# Patient Record
Sex: FEMALE | Employment: UNEMPLOYED | URBAN - METROPOLITAN AREA
[De-identification: names, ages, dates, MRNs, and addresses within clinical notes are randomized per-mention and may not be internally consistent; named-entity substitution may affect disease eponyms.]

---

## 2021-11-07 ENCOUNTER — ANESTHESIA EVENT (OUTPATIENT)
Dept: LABOR AND DELIVERY | Age: 36
DRG: 560 | End: 2021-11-07
Payer: MEDICAID

## 2021-11-07 ENCOUNTER — HOSPITAL ENCOUNTER (INPATIENT)
Age: 36
LOS: 2 days | Discharge: HOME | DRG: 560 | End: 2021-11-09
Attending: OBSTETRICS & GYNECOLOGY | Admitting: OBSTETRICS & GYNECOLOGY
Payer: MEDICAID

## 2021-11-07 ENCOUNTER — ANESTHESIA (OUTPATIENT)
Dept: LABOR AND DELIVERY | Age: 36
DRG: 560 | End: 2021-11-07
Payer: MEDICAID

## 2021-11-07 PROBLEM — Z37.9 NORMAL LABOR: Status: ACTIVE | Noted: 2021-11-07

## 2021-11-07 PROBLEM — Z34.93 ENCOUNTER FOR PREGNANCY RELATED EXAMINATION IN THIRD TRIMESTER: Status: ACTIVE | Noted: 2021-11-07

## 2021-11-07 PROBLEM — Z34.93 ENCOUNTER FOR PREGNANCY RELATED EXAMINATION IN THIRD TRIMESTER: Status: RESOLVED | Noted: 2021-11-07 | Resolved: 2021-11-07

## 2021-11-07 LAB
ABO/RH: NORMAL
AMPHETAMINES: NEGATIVE
ANTIBODY SCREEN: NEGATIVE
BACTERIA: NEGATIVE /HPF
BARBITURATE SCREEN URINE: NEGATIVE
BASOPHILS ABSOLUTE: 0 K/CU MM
BASOPHILS RELATIVE PERCENT: 0.3 % (ref 0–1)
BENZODIAZEPINE SCREEN, URINE: NEGATIVE
BILIRUBIN URINE: NEGATIVE MG/DL
BLOOD, URINE: NEGATIVE
CANNABINOID SCREEN URINE: NEGATIVE
CLARITY: CLEAR
COCAINE METABOLITE: NEGATIVE
COLOR: COLORLESS
DIFFERENTIAL TYPE: ABNORMAL
EOSINOPHILS ABSOLUTE: 0.2 K/CU MM
EOSINOPHILS RELATIVE PERCENT: 2.5 % (ref 0–3)
GLUCOSE, URINE: NEGATIVE MG/DL
HCT VFR BLD CALC: 34.8 % (ref 37–47)
HEMOGLOBIN: 11.4 GM/DL (ref 12.5–16)
IMMATURE NEUTROPHIL %: 0.4 % (ref 0–0.43)
KETONES, URINE: NEGATIVE MG/DL
LEUKOCYTE ESTERASE, URINE: NEGATIVE
LYMPHOCYTES ABSOLUTE: 2 K/CU MM
LYMPHOCYTES RELATIVE PERCENT: 26 % (ref 24–44)
MCH RBC QN AUTO: 32.5 PG (ref 27–31)
MCHC RBC AUTO-ENTMCNC: 32.8 % (ref 32–36)
MCV RBC AUTO: 99.1 FL (ref 78–100)
MONOCYTES ABSOLUTE: 0.9 K/CU MM
MONOCYTES RELATIVE PERCENT: 12.2 % (ref 0–4)
MUCUS: ABNORMAL HPF
NITRITE URINE, QUANTITATIVE: NEGATIVE
NUCLEATED RBC %: 0 %
OPIATES, URINE: NEGATIVE
OXYCODONE: NEGATIVE
PDW BLD-RTO: 14.2 % (ref 11.7–14.9)
PH, URINE: 8 (ref 5–8)
PHENCYCLIDINE, URINE: NEGATIVE
PLATELET # BLD: 177 K/CU MM (ref 140–440)
PMV BLD AUTO: 11.1 FL (ref 7.5–11.1)
PROTEIN UA: NEGATIVE MG/DL
RBC # BLD: 3.51 M/CU MM (ref 4.2–5.4)
RBC URINE: ABNORMAL /HPF (ref 0–6)
SEGMENTED NEUTROPHILS ABSOLUTE COUNT: 4.5 K/CU MM
SEGMENTED NEUTROPHILS RELATIVE PERCENT: 58.6 % (ref 36–66)
SPECIFIC GRAVITY UA: 1 (ref 1–1.03)
SQUAMOUS EPITHELIAL: <1 /HPF
TOTAL IMMATURE NEUTOROPHIL: 0.03 K/CU MM
TOTAL NUCLEATED RBC: 0 K/CU MM
TRICHOMONAS: ABNORMAL /HPF
UROBILINOGEN, URINE: NEGATIVE MG/DL (ref 0.2–1)
WBC # BLD: 7.7 K/CU MM (ref 4–10.5)
WBC UA: ABNORMAL /HPF (ref 0–5)

## 2021-11-07 PROCEDURE — 6360000002 HC RX W HCPCS: Performed by: NURSE ANESTHETIST, CERTIFIED REGISTERED

## 2021-11-07 PROCEDURE — 10907ZC DRAINAGE OF AMNIOTIC FLUID, THERAPEUTIC FROM PRODUCTS OF CONCEPTION, VIA NATURAL OR ARTIFICIAL OPENING: ICD-10-PCS | Performed by: OBSTETRICS & GYNECOLOGY

## 2021-11-07 PROCEDURE — 7200000001 HC VAGINAL DELIVERY

## 2021-11-07 PROCEDURE — 86901 BLOOD TYPING SEROLOGIC RH(D): CPT

## 2021-11-07 PROCEDURE — 85025 COMPLETE CBC W/AUTO DIFF WBC: CPT

## 2021-11-07 PROCEDURE — 1220000000 HC SEMI PRIVATE OB R&B

## 2021-11-07 PROCEDURE — 2580000003 HC RX 258: Performed by: OBSTETRICS & GYNECOLOGY

## 2021-11-07 PROCEDURE — 51703 INSERT BLADDER CATH COMPLEX: CPT

## 2021-11-07 PROCEDURE — 80307 DRUG TEST PRSMV CHEM ANLYZR: CPT

## 2021-11-07 PROCEDURE — 86850 RBC ANTIBODY SCREEN: CPT

## 2021-11-07 PROCEDURE — 2500000003 HC RX 250 WO HCPCS: Performed by: NURSE ANESTHETIST, CERTIFIED REGISTERED

## 2021-11-07 PROCEDURE — 86900 BLOOD TYPING SEROLOGIC ABO: CPT

## 2021-11-07 PROCEDURE — 81001 URINALYSIS AUTO W/SCOPE: CPT

## 2021-11-07 RX ORDER — ONDANSETRON 2 MG/ML
4 INJECTION INTRAMUSCULAR; INTRAVENOUS EVERY 6 HOURS PRN
Status: DISCONTINUED | OUTPATIENT
Start: 2021-11-07 | End: 2021-11-07

## 2021-11-07 RX ORDER — ROPIVACAINE HYDROCHLORIDE 2 MG/ML
12 INJECTION, SOLUTION EPIDURAL; INFILTRATION; PERINEURAL CONTINUOUS
Status: DISCONTINUED | OUTPATIENT
Start: 2021-11-07 | End: 2021-11-09 | Stop reason: HOSPADM

## 2021-11-07 RX ORDER — SODIUM CHLORIDE 9 MG/ML
25 INJECTION, SOLUTION INTRAVENOUS PRN
Status: DISCONTINUED | OUTPATIENT
Start: 2021-11-07 | End: 2021-11-09 | Stop reason: HOSPADM

## 2021-11-07 RX ORDER — SODIUM CHLORIDE 9 MG/ML
25 INJECTION, SOLUTION INTRAVENOUS PRN
Status: DISCONTINUED | OUTPATIENT
Start: 2021-11-07 | End: 2021-11-07

## 2021-11-07 RX ORDER — LANOLIN 100 %
OINTMENT (GRAM) TOPICAL PRN
Status: DISCONTINUED | OUTPATIENT
Start: 2021-11-07 | End: 2021-11-09 | Stop reason: HOSPADM

## 2021-11-07 RX ORDER — DOCUSATE SODIUM 100 MG/1
100 CAPSULE, LIQUID FILLED ORAL 2 TIMES DAILY
Status: DISCONTINUED | OUTPATIENT
Start: 2021-11-07 | End: 2021-11-07

## 2021-11-07 RX ORDER — SODIUM CHLORIDE, SODIUM LACTATE, POTASSIUM CHLORIDE, CALCIUM CHLORIDE 600; 310; 30; 20 MG/100ML; MG/100ML; MG/100ML; MG/100ML
INJECTION, SOLUTION INTRAVENOUS CONTINUOUS
Status: DISCONTINUED | OUTPATIENT
Start: 2021-11-07 | End: 2021-11-07

## 2021-11-07 RX ORDER — SODIUM CHLORIDE, SODIUM LACTATE, POTASSIUM CHLORIDE, AND CALCIUM CHLORIDE .6; .31; .03; .02 G/100ML; G/100ML; G/100ML; G/100ML
1000 INJECTION, SOLUTION INTRAVENOUS PRN
Status: DISCONTINUED | OUTPATIENT
Start: 2021-11-07 | End: 2021-11-09 | Stop reason: HOSPADM

## 2021-11-07 RX ORDER — ONDANSETRON 4 MG/1
8 TABLET, ORALLY DISINTEGRATING ORAL EVERY 8 HOURS PRN
Status: DISCONTINUED | OUTPATIENT
Start: 2021-11-07 | End: 2021-11-09 | Stop reason: HOSPADM

## 2021-11-07 RX ORDER — LIDOCAINE HYDROCHLORIDE 10 MG/ML
INJECTION, SOLUTION EPIDURAL; INFILTRATION; INTRACAUDAL; PERINEURAL PRN
Status: DISCONTINUED | OUTPATIENT
Start: 2021-11-07 | End: 2021-12-06 | Stop reason: SDUPTHER

## 2021-11-07 RX ORDER — ROPIVACAINE HYDROCHLORIDE 2 MG/ML
INJECTION, SOLUTION EPIDURAL; INFILTRATION; PERINEURAL CONTINUOUS PRN
Status: DISCONTINUED | OUTPATIENT
Start: 2021-11-07 | End: 2021-12-06 | Stop reason: SDUPTHER

## 2021-11-07 RX ORDER — LIDOCAINE HYDROCHLORIDE AND EPINEPHRINE 15; 5 MG/ML; UG/ML
INJECTION, SOLUTION EPIDURAL PRN
Status: DISCONTINUED | OUTPATIENT
Start: 2021-11-07 | End: 2021-12-06 | Stop reason: SDUPTHER

## 2021-11-07 RX ORDER — SODIUM CHLORIDE, SODIUM LACTATE, POTASSIUM CHLORIDE, AND CALCIUM CHLORIDE .6; .31; .03; .02 G/100ML; G/100ML; G/100ML; G/100ML
500 INJECTION, SOLUTION INTRAVENOUS PRN
Status: DISCONTINUED | OUTPATIENT
Start: 2021-11-07 | End: 2021-11-09 | Stop reason: HOSPADM

## 2021-11-07 RX ORDER — ROPIVACAINE HYDROCHLORIDE 2 MG/ML
INJECTION, SOLUTION EPIDURAL; INFILTRATION; PERINEURAL PRN
Status: DISCONTINUED | OUTPATIENT
Start: 2021-11-07 | End: 2021-12-06 | Stop reason: SDUPTHER

## 2021-11-07 RX ORDER — SODIUM CHLORIDE 0.9 % (FLUSH) 0.9 %
5-40 SYRINGE (ML) INJECTION PRN
Status: DISCONTINUED | OUTPATIENT
Start: 2021-11-07 | End: 2021-11-09 | Stop reason: HOSPADM

## 2021-11-07 RX ORDER — SODIUM CHLORIDE, SODIUM LACTATE, POTASSIUM CHLORIDE, CALCIUM CHLORIDE 600; 310; 30; 20 MG/100ML; MG/100ML; MG/100ML; MG/100ML
INJECTION, SOLUTION INTRAVENOUS CONTINUOUS
Status: DISCONTINUED | OUTPATIENT
Start: 2021-11-08 | End: 2021-11-09 | Stop reason: HOSPADM

## 2021-11-07 RX ORDER — SODIUM CHLORIDE 0.9 % (FLUSH) 0.9 %
5-40 SYRINGE (ML) INJECTION 2 TIMES DAILY
Status: DISCONTINUED | OUTPATIENT
Start: 2021-11-08 | End: 2021-11-09 | Stop reason: HOSPADM

## 2021-11-07 RX ORDER — ACETAMINOPHEN 325 MG/1
650 TABLET ORAL EVERY 4 HOURS PRN
Status: DISCONTINUED | OUTPATIENT
Start: 2021-11-07 | End: 2021-11-09 | Stop reason: HOSPADM

## 2021-11-07 RX ORDER — SODIUM CHLORIDE 0.9 % (FLUSH) 0.9 %
5-40 SYRINGE (ML) INJECTION EVERY 12 HOURS SCHEDULED
Status: DISCONTINUED | OUTPATIENT
Start: 2021-11-07 | End: 2021-11-07

## 2021-11-07 RX ORDER — NALOXONE HYDROCHLORIDE 0.4 MG/ML
0.4 INJECTION, SOLUTION INTRAMUSCULAR; INTRAVENOUS; SUBCUTANEOUS PRN
Status: DISCONTINUED | OUTPATIENT
Start: 2021-11-07 | End: 2021-11-09 | Stop reason: HOSPADM

## 2021-11-07 RX ORDER — SODIUM CHLORIDE 0.9 % (FLUSH) 0.9 %
5-40 SYRINGE (ML) INJECTION PRN
Status: DISCONTINUED | OUTPATIENT
Start: 2021-11-07 | End: 2021-11-07

## 2021-11-07 RX ORDER — ONDANSETRON 2 MG/ML
4 INJECTION INTRAMUSCULAR; INTRAVENOUS EVERY 6 HOURS PRN
Status: DISCONTINUED | OUTPATIENT
Start: 2021-11-07 | End: 2021-11-09 | Stop reason: HOSPADM

## 2021-11-07 RX ORDER — DOCUSATE SODIUM 100 MG/1
100 CAPSULE, LIQUID FILLED ORAL 2 TIMES DAILY
Status: DISCONTINUED | OUTPATIENT
Start: 2021-11-08 | End: 2021-11-09 | Stop reason: HOSPADM

## 2021-11-07 RX ADMIN — LIDOCAINE HYDROCHLORIDE 3 ML: 10 INJECTION, SOLUTION EPIDURAL; INFILTRATION; INTRACAUDAL; PERINEURAL at 20:20

## 2021-11-07 RX ADMIN — Medication 500 ML: at 22:22

## 2021-11-07 RX ADMIN — SODIUM CHLORIDE, POTASSIUM CHLORIDE, SODIUM LACTATE AND CALCIUM CHLORIDE: 600; 310; 30; 20 INJECTION, SOLUTION INTRAVENOUS at 20:17

## 2021-11-07 RX ADMIN — ROPIVACAINE HYDROCHLORIDE 12 ML/HR: 2 INJECTION, SOLUTION EPIDURAL; INFILTRATION; PERINEURAL at 20:36

## 2021-11-07 RX ADMIN — LIDOCAINE HYDROCHLORIDE,EPINEPHRINE BITARTRATE 5 ML: 15; .005 INJECTION, SOLUTION EPIDURAL; INFILTRATION; INTRACAUDAL; PERINEURAL at 20:27

## 2021-11-07 RX ADMIN — SODIUM CHLORIDE, POTASSIUM CHLORIDE, SODIUM LACTATE AND CALCIUM CHLORIDE: 600; 310; 30; 20 INJECTION, SOLUTION INTRAVENOUS at 19:05

## 2021-11-07 RX ADMIN — ROPIVACAINE HYDROCHLORIDE 5 MG: 2 INJECTION, SOLUTION EPIDURAL; INFILTRATION at 20:34

## 2021-11-07 RX ADMIN — ROPIVACAINE HYDROCHLORIDE 5 MG: 2 INJECTION, SOLUTION EPIDURAL; INFILTRATION at 20:29

## 2021-11-07 NOTE — H&P
Department of Obstetrics and Gynecology   Obstetrics History and Physical        CHIEF COMPLAINT:   Chief Complaint   Patient presents with    Abdominal Pain     x 3 days          HISTORY OF PRESENT ILLNESS:      The patient is a 39 y.o. female at 41w4d. OB History        3    Para   2    Term   2            AB        Living   2       SAB        IAB        Ectopic        Molar        Multiple        Live Births   2            Patient presents with a chief complaint as above and is being admitted for active phase labor    Estimated Due Date: Estimated Date of Delivery: 21    PRENATAL CARE:    Complicated by: scantly prenatal care. Pt reports starting her prenatal care at [address] at 8 months    PAST OB HISTORY  OB History        3    Para   2    Term   2            AB        Living   2       SAB        IAB        Ectopic        Molar        Multiple        Live Births   2                Past Medical History:        Diagnosis Date    Anemia     Per patient report     Past Surgical History:    History reviewed. No pertinent surgical history. Allergies:  Patient has no known allergies.   Social History:    Social History     Socioeconomic History    Marital status: Single     Spouse name: Not on file    Number of children: Not on file    Years of education: Not on file    Highest education level: Not on file   Occupational History    Not on file   Tobacco Use    Smoking status: Never Smoker    Smokeless tobacco: Never Used   Vaping Use    Vaping Use: Not on file   Substance and Sexual Activity    Alcohol use: Not Currently    Drug use: Never    Sexual activity: Not on file   Other Topics Concern    Not on file   Social History Narrative    Not on file     Social Determinants of Health     Financial Resource Strain:     Difficulty of Paying Living Expenses: Not on file   Food Insecurity:     Worried About Running Out of Food in the Last Year: Not on file    [address] in the Last Year: Not on file   Transportation Needs:     Lack of Transportation (Medical): Not on file    Lack of Transportation (Non-Medical): Not on file   Physical Activity:     Days of Exercise per Week: Not on file    Minutes of Exercise per Session: Not on file   Stress:     Feeling of Stress : Not on file   Social Connections:     Frequency of Communication with Friends and Family: Not on file    Frequency of Social Gatherings with Friends and Family: Not on file    Attends Confucianism Services: Not on file    Active Member of [address] [place] [address] [employer] or Organizations: Not on file    Attends Club or Organization Meetings: Not on file    Marital Status: Not on file   Intimate Partner Violence:     Fear of Current or Ex-Partner: Not on file    Emotionally Abused: Not on file    Physically Abused: Not on file    Sexually Abused: Not on file   Housing Stability:     Unable to Pay for Housing in the Last Year: Not on file    Number of [place] in the Last Year: Not on file    Unstable Housing in the Last Year: Not on file     Family History:   History reviewed. No pertinent family history. Medications Prior to Admission:  No medications prior to admission. REVIEW OF SYSTEMS:    CONSTITUTIONAL:  negative  RESPIRATORY:  negative  CARDIOVASCULAR:  negative  GASTROINTESTINAL:  negative  ALLERGIC/IMMUNOLOGIC:  negative  NEUROLOGICAL:  negative  BEHAVIOR/PSYCH:  negative    PHYSICAL EXAM:  Blood pressure 118/76, pulse 86, temperature 98.2 °F (36.8 °C), temperature source Oral, resp. rate 19, SpO2 100 %. General appearance:  awake, alert, cooperative, no apparent distress, and appears stated age  Neurologic:  Awake, alert, oriented to name, place and time.     Lungs:  No increased work of breathing, good air exchange  Abdomen:  Soft, non tender, gravid, consistent with her gestational age,   Fetal heart rate:    Baseline Heart Rate:  135        Accelerations:  present       Long Term Variability:  moderate Decelerations:  absent       Pelvis:  Adequate pelvis  Cervix: 3-4 per RN exam    Contraction frequency:  2-4 minutes    Membranes:  Intact    ASSESSMENT AND PLAN:    Labor: Admit, anticipate normal delivery, routine labor orders  Fetus: Reassuring  GBS:negative  Other: expectant management.  Epidural prn    Pt is [place] speaking

## 2021-11-07 NOTE — FLOWSHEET NOTE
During use of  #[id number], pt reports she started receiving care for this pregnancy at around 8 months at the [facility] and does not think that they have done any testing for her other than an ultrasound.

## 2021-11-07 NOTE — FLOWSHEET NOTE
Dr. Urbano Burks on unit and reviewing strip. Made aware of patient's complaints and obstetrical and medical history, SVE, and negative amnisure. Admission labor orders being placed per Dr. Urbano Burks. MRI attempted but not completed. Patient became hypoxic, hypertensive, and tachycardic when laying flat. CARLEEN Cordova updated.

## 2021-11-07 NOTE — FLOWSHEET NOTE
Pt arrived with spouse via wheelchair to birthing center with complaints of lower abdominal pain. Pt speaks [place]. Unable to connect with [place]  but able to connect with [place]  and patient able to understand. Pt reports lower abdominal pain for the past 3 days. States that she has been feeling her baby move. Denies nausea or vomiting. Reports vaginal leaking of fluid. Denies vaginal bleeding. Pt reports this is her third baby and has had her other children vaginally in other countries. Denies any known complications with this pregnancy or others. Reports receiving care \"a couple of times\" at the [address]. SVE performed. See documentation. Pt oriented to room and tentative POC discussed.

## 2021-11-08 PROCEDURE — 6370000000 HC RX 637 (ALT 250 FOR IP): Performed by: OBSTETRICS & GYNECOLOGY

## 2021-11-08 PROCEDURE — 1220000000 HC SEMI PRIVATE OB R&B

## 2021-11-08 RX ADMIN — ACETAMINOPHEN 650 MG: 325 TABLET ORAL at 14:22

## 2021-11-08 RX ADMIN — DOCUSATE SODIUM 100 MG: 100 CAPSULE, LIQUID FILLED ORAL at 22:03

## 2021-11-08 RX ADMIN — DOCUSATE SODIUM 100 MG: 100 CAPSULE, LIQUID FILLED ORAL at 08:22

## 2021-11-08 RX ADMIN — ACETAMINOPHEN 650 MG: 325 TABLET ORAL at 08:22

## 2021-11-08 ASSESSMENT — PAIN SCALES - GENERAL
PAINLEVEL_OUTOF10: 0
PAINLEVEL_OUTOF10: 0
PAINLEVEL_OUTOF10: 3
PAINLEVEL_OUTOF10: 3
PAINLEVEL_OUTOF10: 0
PAINLEVEL_OUTOF10: 0

## 2021-11-08 NOTE — ANESTHESIA POSTPROCEDURE EVALUATION
Department of Anesthesiology  Postprocedure Note    Patient: Tamhina Francois  MRN: [id number]  YOB: 1985  Date of evaluation: 11/8/2021  Time:  6:35 PM     Procedure Summary     Date: 11/07/21 Room / Location:     Anesthesia Start: 2020 Anesthesia Stop:     Procedure: Labor Analgesia Diagnosis:     Scheduled Providers:  Responsible Provider: Janes Ng MD    Anesthesia Type: epidural ASA Status: 2          Anesthesia Type: No value filed. Horacio Phase I: Horacio Score: 9    Horacio Phase II:      Last vitals: Reviewed and per EMR flowsheets.        Anesthesia Post Evaluation    Patient location during evaluation: floor  Patient participation: complete - patient participated  Level of consciousness: awake and alert  Pain score: 1  Airway patency: patent  Nausea & Vomiting: no nausea  Complications: no  Cardiovascular status: hemodynamically stable  Respiratory status: acceptable  Hydration status: euvolemic

## 2021-11-08 NOTE — PLAN OF CARE
Problem: Pain:  Goal: Pain level will decrease  Description: Pain level will decrease  11/8/2021 1204 by Chuy Moyer RN  Outcome: Ongoing  11/8/2021 0128 by Kayla Holman RN  Outcome: Ongoing  11/8/2021 0128 by Kayla Holman RN  Outcome: Ongoing  Goal: Control of acute pain  Description: Control of acute pain  11/8/2021 0128 by Kayla Holman RN  Outcome: Ongoing  11/8/2021 0128 by Kayla Holman RN  Outcome: Ongoing  Goal: Control of chronic pain  Description: Control of chronic pain  11/8/2021 0128 by Kayla Holman RN  Outcome: Ongoing  11/8/2021 0128 by Kayla Holman RN  Outcome: Ongoing     Problem: Discharge Planning:  Goal: Discharged to appropriate level of care  Description: Discharged to appropriate level of care  11/8/2021 1204 by Chuy Moyer RN  Outcome: Ongoing  11/8/2021 0128 by Kayla Holman RN  Outcome: Ongoing     Problem: Constipation:  Goal: Bowel elimination is within specified parameters  Description: Bowel elimination is within specified parameters  11/8/2021 1204 by Chuy Moyer RN  Outcome: Ongoing  11/8/2021 0128 by Kayla Holman RN  Outcome: Ongoing     Problem: Fluid Volume - Imbalance:  Goal: Absence of imbalanced fluid volume signs and symptoms  Description: Absence of imbalanced fluid volume signs and symptoms  11/8/2021 1204 by Chuy Moyer RN  Outcome: Ongoing  11/8/2021 0128 by Kayla Holman RN  Outcome: Ongoing  Goal: Absence of postpartum hemorrhage signs and symptoms  Description: Absence of postpartum hemorrhage signs and symptoms  11/8/2021 1204 by Chuy Moyer RN  Outcome: Ongoing  11/8/2021 0128 by Kayla Holman RN  Outcome: Ongoing     Problem: Infection - Risk of, Puerperal Infection:  Goal: Will show no infection signs and symptoms  Description: Will show no infection signs and symptoms  11/8/2021 1204 by Chuy Moyer RN  Outcome: Ongoing  11/8/2021 0128 by Kayla Holman RN  Outcome: Ongoing Learning behavioral activities to cope with urges.  For example, distraction and changing routines

## 2021-11-08 NOTE — ANESTHESIA PRE PROCEDURE
Department of Anesthesiology  Preprocedure Note       Name:  Trev Chester   Age:  39 y.o.  :  1985                                          MRN:  [id number]         Date:  2021      Surgeon: * No surgeons listed *    Procedure: * No procedures listed *    Medications prior to admission:   Prior to Admission medications    Not on File       Current medications:    Current Facility-Administered Medications   Medication Dose Route Frequency Provider Last Rate Last Admin    lactated ringers infusion   IntraVENous Continuous Aydee Dunn  mL/hr at 21 New Bag at 21    lactated ringers bolus  500 mL IntraVENous PRN Aydee Dunn MD        Or    lactated ringers bolus  1,000 mL IntraVENous PRN Aydee Dunn MD        sodium chloride flush 0.9 % injection 5-40 mL  5-40 mL IntraVENous 2 times per day Love Hobbs MD        sodium chloride flush 0.9 % injection 5-40 mL  5-40 mL IntraVENous PRN Aydee Dunn MD        0.9 % sodium chloride infusion  25 mL IntraVENous PRN Aydee Dunn MD        oxytocin (PITOCIN) 30 units in 500 mL infusion  87.3 artis-units/min IntraVENous Continuous PRN Aydee Dunn MD        And    oxytocin (PITOCIN) 10 unit bolus from the bag  10 Units IntraVENous PRN Aydee Dunn MD        docusate sodium (COLACE) capsule 100 mg  100 mg Oral BID Aydee Dunn MD        famotidine (PEPCID) injection 20 mg  20 mg IntraVENous BID PRN oLve Hobbs MD        ropivacaine (NAROPIN) 0.2% injection 0.2%  12 mL/hr Epidural Continuous Les Tanja, APRN - CRNA        naloxone [facility]) injection 0.4 mg  0.4 mg IntraVENous PRN Les Tanja, APRN - CRNA        [facility] injection 4 mg  4 mg IntraVENous Q6H PRN Les Tanja, APRN - CRNA         Facility-Administered Medications Ordered in Other Encounters   Medication Dose Route Frequency Provider Last Rate Last Admin    lidocaine PF 1 % injection   Subcuticular PRN Chencho Sifuentes, APRN - CRNA   3 mL at 11/07/21 2020    Lidocaine-EPINEPHrine 1.5 %-1:[id number]   Epidural PRN Chencho Sifuentes APRN - CRNA   5 mL at 11/07/21 2027    ropivacaine (NAROPIN) 0.2% injection 0.2%   Epidural PRN Chencho Sifuentes APRN - CRNA   5 mg at 11/07/21 2034    ropivacaine (NAROPIN) 0.2% injection 0.2%   Epidural Continuous PRN Chencho Sifuentes APRN - CRNA 12 mL/hr at 11/07/21 2036 12 mL/hr at 11/07/21 2036       Allergies:  No Known Allergies    Problem List:    Patient Active Problem List   Diagnosis Code    Normal labor O80, Z37.9       Past Medical History:        Diagnosis Date    Anemia     Per patient report       Past Surgical History:  History reviewed. No pertinent surgical history. Social History:    Social History     Tobacco Use    Smoking status: Never Smoker    Smokeless tobacco: Never Used   Substance Use Topics    Alcohol use: Not Currently                                Counseling given: Not Answered      Vital Signs (Current):   Vitals:    11/07/21 1737 11/07/21 2030 11/07/21 2033 11/07/21 2034   BP: 118/76 110/71  121/73   Pulse: 86 81  81   Resp: 19      Temp:       TempSrc:       SpO2: 100%  100%                                               BP Readings from Last 3 Encounters:   11/07/21 121/73       NPO Status:                                                                                 BMI:   Wt Readings from Last 3 Encounters:   No data found for Wt     There is no height or weight on file to calculate BMI.    CBC:   Lab Results   Component Value Date    WBC 7.7 11/07/2021    RBC 3.51 11/07/2021    HGB 11.4 11/07/2021    HCT 34.8 11/07/2021    MCV 99.1 11/07/2021    RDW 14.2 11/07/2021     11/07/2021       CMP: No results found for: NA, K, CL, CO2, BUN, CREATININE, GFRAA, AGRATIO, LABGLOM, GLUCOSE, PROT, CALCIUM, BILITOT, ALKPHOS, AST, ALT    POC Tests: No results for input(s): POCGLU, POCNA, POCK, POCCL, POCBUN, POCHEMO, POCHCT in the last 72 hours.     Coags: No results found for: PROTIME, INR, APTT    HCG (If Applicable): No results found for: PREGTESTUR, PREGSERUM, HCG, HCGQUANT     ABGs: No results found for: PHART, PO2ART, VTU8GMS, AGW5ZEF, BEART, V1PJLTQR     Type & Screen (If Applicable):  No results found for: LABABO, LABRH    Drug/Infectious Status (If Applicable):  No results found for: HIV, HEPCAB    COVID-19 Screening (If Applicable): No results found for: COVID19        Anesthesia Evaluation  Patient summary reviewed and Nursing notes reviewed no history of anesthetic complications:   Airway: Mallampati: II  TM distance: >3 FB   Neck ROM: full  Mouth opening: > = 3 FB Dental: normal exam         Pulmonary:Negative Pulmonary ROS                              Cardiovascular:Negative CV ROS  Exercise tolerance: good (>4 METS),                     Neuro/Psych:   Negative Neuro/Psych ROS              GI/Hepatic/Renal: Neg GI/Hepatic/Renal ROS            Endo/Other: Negative Endo/Other ROS                    Abdominal:             Vascular: negative vascular ROS. Other Findings:             Anesthesia Plan      epidural     ASA 2     (Via  tablet)        Anesthetic plan and risks discussed with patient and spouse.                       CHAPIN Alberto - CRNA   11/7/2021

## 2021-11-08 NOTE — ANESTHESIA PROCEDURE NOTES
Epidural Block    Patient location during procedure: OB  Start time: 11/7/2021 8:20 PM  End time: 11/7/2021 8:42 PM  Reason for block: labor epidural  Staffing  Performed: resident/CRNA   Resident/CRNA: CHAPIN Mott CRNA  Preanesthetic Checklist  Completed: patient identified, IV checked, site marked, risks and benefits discussed, monitors and equipment checked, pre-op evaluation, timeout performed, anesthesia consent given, oxygen available and patient being monitored  Epidural  Patient position: sitting  Prep: ChloraPrep  Patient monitoring: continuous pulse ox and frequent blood pressure checks  Approach: midline  Location: lumbar (1-5)  Injection technique: INES saline  Provider prep: mask and sterile gloves  Needle  Needle type: Tuohy   Needle gauge: 17 G  Needle length: 3.5 in  Needle insertion depth: 5 cm  Catheter type: side hole  Catheter size: 19 G  Catheter at skin depth: 10 cm  Test dose: negative  Assessment  Sensory level: T6  Hemodynamics: stable  Attempts: 1

## 2021-11-08 NOTE — PLAN OF CARE
Problem: Pain:  Description: Pain management should include both nonpharmacologic and pharmacologic interventions. Goal: Pain level will decrease  Outcome: Ongoing  Goal: Control of acute pain  Outcome: Ongoing  Goal: Control of chronic pain  Outcome: Ongoing     Problem: Pain:  Description: Pain management should include both nonpharmacologic and pharmacologic interventions.   Goal: Pain level will decrease  Outcome: Ongoing  Goal: Control of acute pain  Outcome: Ongoing  Goal: Control of chronic pain  Outcome: Ongoing

## 2021-11-08 NOTE — FLOWSHEET NOTE
#[id number] to explain to patient that she was being admitted to labor and delivery and the admission process. Pt and spouse agreeable. IV established and lab work collected. Pt coping with contractions at this time, but wishes to have an epidural when she is able to. Tentative POC discussed with patient and spouse via  language services.

## 2021-11-08 NOTE — L&D DELIVERY NOTE
Mother's Information    Labor Events     labor?: No  Rupture type: Artificial=AROM, Intact  Fluid color: Clear, Bloody Show  Fluid odor: None     Mother Delivery Information    Episiotomy: None  Lacerations: None  Surgical or Additional Est. Blood Loss (mL): 0 (View Only): Edit in Flowsheets   Combined Est. Blood Loss (mL): 0        [facility] [place] Pending Ольга [[id number]]    Labor Events     labor?: No   steroids?: None  Cervical ripening date/time:     Antibiotics received during labor?: No  Rupture date/time: 21 21:21:00   Rupture type: Artificial=AROM, Intact  Fluid color: Clear, Bloody Show  Fluid odor: None  Labor complications: None          Labor Event Times    Labor onset date/time: 21   Dilation complete date/time:      Start pushing:    Decision time (emergent ):        Anesthesia    Method: Epidural     Assisted Delivery Details    Forceps attempted?: No  Vacuum extractor attempted?: No     Document Additional Attempt       Document Additional Attempt              Presentation    Presentation: Vertex  _: Occiput  _: Anterior     Delivery Providers    Delivering clinician:      [facility] [facility]       Delivery Information    Episiotomy: None  Perineal lacerations: None    Vaginal laceration: No    Cervical laceration: No    Surgical or additional est. blood loss (mL): 0 (View Only): Edit in Flowsheets   Combined est. blood loss (mL): 0     Other Procedures    Procedures: None          Department of Obstetrics and Gynecology  Spontaneous Vaginal Delivery Note         Pre-operative Diagnosis:  Term pregnancy, Spontaneous labor, Single fetus and Uncomplicated pregnancy    Post-operative Diagnosis:  Same + live male  Procedure:  Spontaneous vaginal delivery    Surgeon:  Gabriella Durant MD    Information for the patient's :  Lalo Wakefield [[id number]]          Anesthesia:  epidural anesthesia    Estimated blood loss: 300ml    Specimen:  Placenta not sent to pathology     Cord blood sent Yes    Complications:  none    Condition:  infant stable to general nursery    Details of Procedure: The patient is a 39 y.o. female at 41w4d   OB History        3    Para   2    Term   2            AB        Living   2       SAB        IAB        Ectopic        Molar        Multiple        Live Births   2             who was admitted for active phase labor. She received the following interventions: none She was known to be GBS negative and did not receive antibiotic prophylaxis. The patient progressed well,did receive an epidural, became complete and started to push. After pushing for 1 time the fetal head was at the perineum, nose and mouth suctioned with bulb suction and the rest of the infant delivered atraumatically, placed on mother abdomen. Cord was clamped and cut and infant handed off to the waiting nurse for evaluation. The delivery of the placenta was spontaneous. The perineum and vagina were explored and  no laceration was noted. Infant's name is undecided.

## 2021-11-08 NOTE — PLAN OF CARE
Problem: Pain:  Goal: Pain level will decrease  Description: Pain level will decrease  11/8/2021 0128 by Marlen Garrison RN  Outcome: Ongoing  11/8/2021 0128 by Marlen Garrison RN  Outcome: Ongoing  11/7/2021 2000 by Moe Fiore RN  Outcome: Ongoing  Goal: Control of acute pain  Description: Control of acute pain  11/8/2021 0128 by Marlen Garrison RN  Outcome: Ongoing  11/8/2021 0128 by Marlen Garrison RN  Outcome: Ongoing  11/7/2021 2000 by Moe Fiore RN  Outcome: Ongoing  Goal: Control of chronic pain  Description: Control of chronic pain  11/8/2021 0128 by Marlen Garrison RN  Outcome: Ongoing  11/8/2021 0128 by Marlen Garrison RN  Outcome: Ongoing  11/7/2021 2000 by Moe Fiore RN  Outcome: Ongoing

## 2021-11-08 NOTE — FLOWSHEET NOTE
CRNA at bedside to do epidural,  #[id number] obtained via language line, consent received and pt voices understanding of procedure.

## 2021-11-08 NOTE — PLAN OF CARE
Problem: Pain:  Goal: Pain level will decrease  Description: Pain level will decrease  11/8/2021 1204 by Mk Miller RN  Outcome: Ongoing  11/8/2021 0128 by Ioana Kenney RN  Outcome: Ongoing  11/8/2021 0128 by Ioana Kenney RN  Outcome: Ongoing  Goal: Control of acute pain  Description: Control of acute pain  11/8/2021 0128 by Ioana Kenney RN  Outcome: Ongoing  11/8/2021 0128 by Ioana Kenney RN  Outcome: Ongoing  Goal: Control of chronic pain  Description: Control of chronic pain  11/8/2021 0128 by Ioana Kenney RN  Outcome: Ongoing  11/8/2021 0128 by Ioana Kenney RN  Outcome: Ongoing     Problem: Discharge Planning:  Goal: Discharged to appropriate level of care  Description: Discharged to appropriate level of care  11/8/2021 1204 by Mk Miller RN  Outcome: Ongoing  11/8/2021 0128 by Ioana Kenney RN  Outcome: Ongoing     Problem: Constipation:  Goal: Bowel elimination is within specified parameters  Description: Bowel elimination is within specified parameters  11/8/2021 1204 by Mk Miller RN  Outcome: Ongoing  11/8/2021 0128 by Ioana Kenney RN  Outcome: Ongoing     Problem: Fluid Volume - Imbalance:  Goal: Absence of imbalanced fluid volume signs and symptoms  Description: Absence of imbalanced fluid volume signs and symptoms  11/8/2021 1204 by Mk Miller RN  Outcome: Ongoing  11/8/2021 0128 by Ioana Kenney RN  Outcome: Ongoing  Goal: Absence of postpartum hemorrhage signs and symptoms  Description: Absence of postpartum hemorrhage signs and symptoms  11/8/2021 1204 by Mk Miller RN  Outcome: Ongoing  11/8/2021 0128 by Ioana Kenney RN  Outcome: Ongoing     Problem: Infection - Risk of, Puerperal Infection:  Goal: Will show no infection signs and symptoms  Description: Will show no infection signs and symptoms  11/8/2021 1204 by Mk Miller RN  Outcome: Ongoing  11/8/2021 0128 by Ioana Kenney RN  Outcome: Ongoing Problem: Mood - Altered:  Goal: Mood stable  Description: Mood stable  11/8/2021 1204 by Brittaney Aguilar RN  Outcome: Ongoing  11/8/2021 0128 by Giselle Gallo RN  Outcome: Ongoing     Problem: Pain - Acute:  Goal: Pain level will decrease  Description: Pain level will decrease  11/8/2021 1204 by Brittaney Aguilar RN  Outcome: Ongoing  11/8/2021 0128 by Giselle Gallo RN  Outcome: Ongoing  11/8/2021 0128 by Giselle Gallo RN  Outcome: Ongoing     Problem: COMMUNICATION IMPAIRMENT  Goal: Ability to express needs and understand communication  Outcome: Ongoing

## 2021-11-08 NOTE — PROGRESS NOTES
Subjective:     Postpartum Day 1: Vaginal delivery    The patient feels well. The patient denies emotional concerns. Pain is well controlled with current medications. The baby is well. Objective:      Vitals:    11/08/21 0822   BP: 103/66   Pulse: 59   Resp: 17   Temp: 97.9 °F (36.6 °C)   SpO2: 100%       General:    alert, appears stated age, and cooperative       Lochia:  appropriate   Uterine Fundus:   firm       DVT Evaluation:  No evidence of DVT seen on physical exam.     Assessment:     1. Post partum day 1 . Doing well. Plan:     Continue current care.

## 2021-11-08 NOTE — FLOWSHEET NOTE
PT up to bathroom at this time. Pt pad saturated and weighed. Nurse believes pt  had episode of incontinence before getting up to use the bathroom. Pt has minimal bleeding when up to use restroom and moved all extremities. Pt returned back to bed and she states she would like to get some sleep. Baby in normal nursery after receiving first bath. Mother requests for baby to return for next feeding. VS WNL. No c/o at this time.

## 2021-11-09 VITALS
OXYGEN SATURATION: 93 % | HEART RATE: 78 BPM | SYSTOLIC BLOOD PRESSURE: 112 MMHG | TEMPERATURE: 98.4 F | BODY MASS INDEX: 22.04 KG/M2 | DIASTOLIC BLOOD PRESSURE: 69 MMHG | HEIGHT: 65 IN | RESPIRATION RATE: 18 BRPM | WEIGHT: 132.28 LBS

## 2021-11-09 PROCEDURE — 6370000000 HC RX 637 (ALT 250 FOR IP): Performed by: OBSTETRICS & GYNECOLOGY

## 2021-11-09 RX ORDER — IBUPROFEN 800 MG/1
800 TABLET ORAL EVERY 8 HOURS PRN
Status: DISCONTINUED | OUTPATIENT
Start: 2021-11-09 | End: 2021-11-09 | Stop reason: HOSPADM

## 2021-11-09 RX ORDER — IBUPROFEN 800 MG/1
800 TABLET ORAL EVERY 8 HOURS PRN
Qty: 120 TABLET | Refills: 3 | Status: SHIPPED | OUTPATIENT
Start: 2021-11-09

## 2021-11-09 RX ADMIN — IBUPROFEN 800 MG: 800 TABLET, FILM COATED ORAL at 08:43

## 2021-11-09 RX ADMIN — IBUPROFEN 800 MG: 800 TABLET, FILM COATED ORAL at 15:20

## 2021-11-09 RX ADMIN — DOCUSATE SODIUM 100 MG: 100 CAPSULE, LIQUID FILLED ORAL at 08:43

## 2021-11-09 ASSESSMENT — PAIN SCALES - GENERAL
PAINLEVEL_OUTOF10: 1
PAINLEVEL_OUTOF10: 0
PAINLEVEL_OUTOF10: 3

## 2021-11-09 ASSESSMENT — PAIN DESCRIPTION - PROGRESSION: CLINICAL_PROGRESSION: GRADUALLY WORSENING

## 2021-11-09 NOTE — DISCHARGE SUMMARY
Obstetrical Discharge Form    Gestational Age:  41w4d    Antepartum complications: none    Date of Delivery:   21      Type of Delivery:   vaginal, spontaneous    Delivered By:                 Cristian Soto:       Information for the patient's :  Beth Earl [[id number]]        Anesthesia:    Epidural    Intrapartum complications: None    Postpartum complications: none    Discharge Date:       Plan:   Follow up    in 6 week(s) \  Good condition at discharge

## 2021-11-10 NOTE — FLOWSHEET NOTE
Language Line #[id number] [place] called. ID bands checked. Infant's ID band removed and stapled to footprint sheet, the mother verified as correct, signed and witnessed by RN. Hugs tag removed. Discharge instructions given and reviewed. Pt understands to make 6 week appt for follow up with Dr Brandon Gilbert at [facility]. Mother states she has safe crib for baby at home and has signed \"Safe Sleep\" paperwork verifying this. Ambulating well at discharge with pain #0. Father of baby driving mother and baby home. Mother verbalizes understanding to follow-up with Pediatric Provider, [address] [place] [address] tomorrow 11- with Baby appt made for 0940. Baby pink, without stress, harnessed into carseat at discharge. Reviewed Circ instructions and when to removed the gaze in the morning at 24 hours and mother agreed with plan.

## 2022-11-01 ENCOUNTER — HOSPITAL ENCOUNTER (EMERGENCY)
Age: 37
Discharge: HOME | End: 2022-11-01
Attending: EMERGENCY MEDICINE
Payer: COMMERCIAL

## 2022-11-01 VITALS
OXYGEN SATURATION: 99 % | DIASTOLIC BLOOD PRESSURE: 70 MMHG | TEMPERATURE: 98.2 F | HEART RATE: 98 BPM | RESPIRATION RATE: 18 BRPM | SYSTOLIC BLOOD PRESSURE: 112 MMHG

## 2022-11-01 DIAGNOSIS — K08.89 ODONTALGIA: Primary | ICD-10-CM

## 2022-11-01 PROCEDURE — 99283 EMERGENCY DEPT VISIT LOW MDM: CPT

## 2022-11-01 RX ORDER — PENICILLIN V POTASSIUM 500 MG/1
500 TABLET ORAL ONCE
Status: DISCONTINUED | OUTPATIENT
Start: 2022-11-01 | End: 2022-11-01 | Stop reason: HOSPADM

## 2022-11-01 RX ORDER — PENICILLIN V POTASSIUM 500 MG/1
500 TABLET ORAL 4 TIMES DAILY
Qty: 40 TABLET | Refills: 0 | Status: SHIPPED | OUTPATIENT
Start: 2022-11-01 | End: 2022-11-11

## 2022-11-01 ASSESSMENT — PAIN SCALES - GENERAL: PAINLEVEL_OUTOF10: 7

## 2022-11-01 ASSESSMENT — PAIN - FUNCTIONAL ASSESSMENT: PAIN_FUNCTIONAL_ASSESSMENT: 0-10

## 2022-11-01 ASSESSMENT — PAIN DESCRIPTION - DESCRIPTORS: DESCRIPTORS: ACHING

## 2022-11-01 ASSESSMENT — PAIN DESCRIPTION - LOCATION: LOCATION: TEETH

## 2022-11-01 NOTE — ED PROVIDER NOTES
Triage Chief Complaint:   Dental Pain    [place]:  Alice Hoffman is a 40 y.o. female that presents with concern for dental pain. She states has been present for several weeks and is gradually worsening. No trauma to the area is reported. No fever or chills. ROS:  General:  No fevers or chills  ENT:  No sore throat, no nasal congestion, no ear pain, + dental pain, no odynophagia. Respiratory:  No cough or dyspnea  Gastrointestinal:  No nausea, no vomiting  Skin:  No rash  Neurologic:  No headache    History obtained via . Past Medical History:   Diagnosis Date    Anemia     Per patient report     History reviewed. No pertinent surgical history. History reviewed. No pertinent family history.   Social History     Socioeconomic History    Marital status: Single     Spouse name: Not on file    Number of children: Not on file    Years of education: Not on file    Highest education level: Not on file   Occupational History    Not on file   Tobacco Use    Smoking status: Never    Smokeless tobacco: Never   Vaping Use    Vaping Use: Not on file   Substance and Sexual Activity    Alcohol use: Not Currently    Drug use: Never    Sexual activity: Not on file   Other Topics Concern    Not on file   Social History Narrative    Not on file     Social Determinants of Health     Financial Resource Strain: Not on file   Food Insecurity: Not on file   Transportation Needs: Not on file   Physical Activity: Not on file   Stress: Not on file   Social Connections: Not on file   Intimate Partner Violence: Not on file   Housing Stability: Not on file     Current Facility-Administered Medications   Medication Dose Route Frequency Provider Last Rate Last Admin    penicillin v potassium (VEETID) tablet 500 mg  500 mg Oral Once Merilee Sicks, DO         Current Outpatient Medications   Medication Sig Dispense Refill    penicillin v potassium (VEETID) 500 MG tablet Take 1 tablet by mouth 4 times daily for 10 days 40 tablet 0    ibuprofen (ADVIL;MOTRIN) 800 MG tablet Take 1 tablet by mouth every 8 hours as needed (as needed for pain) 120 tablet 3     No Known Allergies    Nursing Notes Reviewed    Physical Exam:  ED Triage Vitals [11/01/22 1115]   Enc Vitals Group      /66      Heart Rate 83      Resp 18      Temp 98.2 °F (36.8 °C)      Temp Source Oral      SpO2 100 %      Weight       Height       Head Circumference       Peak Flow       Pain Score       Pain Loc       Pain Edu? Excl. in [address]? General appearance:  No acute distress. Skin:  Warm. Dry. Eye:  Extraocular movements grossly intact. Ears, nose, mouth and throat:  Oral mucosa moist, no edema under the tongue; posterior pharynx without erythema, exudate or edema; no pointing dental abscess. No obvious. Point tenderness over left upper molar. Neck:  Trachea midline. No stridor. No edema or asymmetry of the neck. No tender palpable cervical lymphadenopathy  Heart: Regular rate and rhythm. Respiratory:  Respirations nonlabored. Lungs are clear to auscultation. Neurological:  Alert and oriented. Gait is steady. MDM:  Patient presenting for dental pain which is most likely secondary to tooth decay/dental caries. Cannot exclude early periapical abscess. Clinically there is no evidence of drainable abscess or Joni's angina. No airway compromise or evidence of sepsis. The patient will be given antibiotics, pain medications, and referral to dentist. Char Andres to follow up as soon as possible. I discussed the need for dental followup as emergency department treatment is not the definitive treatment of choice. Patient understands the plan. Return warnings given. Clinical Impression:  1. Odontalgia        DISPOSITION Decision To Discharge 11/01/2022 11:47:17 AM      Disposition referral (if applicable):    Please follow up with a dentist as soon as possible.         [facility] [address]  [phone]    If symptoms worsen    Disposition medications (if applicable):  New Prescriptions    PENICILLIN V POTASSIUM (VEETID) 500 MG TABLET    Take 1 tablet by mouth 4 times daily for 10 days       Comment: Please note this report has been produced using speech recognition software and may contain errors related to that system including errors in grammar, punctuation, and spelling, as well as words and phrases that may be inappropriate. If there are any questions or concerns please feel free to contact the dictating provider for clarification.         Delon Comings, DO  11/05/22 [id number]

## 2022-11-01 NOTE — ED NOTES
Discharge instructions reviewed with patient. The patient voiced understanding of the discharge paperwork and received no prescriptions. The patient left alert and oriented with no questions or concerns.       Lillian Silva RN  11/01/22 6972